# Patient Record
Sex: FEMALE | Race: AMERICAN INDIAN OR ALASKA NATIVE
[De-identification: names, ages, dates, MRNs, and addresses within clinical notes are randomized per-mention and may not be internally consistent; named-entity substitution may affect disease eponyms.]

---
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
## 2022-03-23 ENCOUNTER — HOSPITAL ENCOUNTER (EMERGENCY)
Dept: HOSPITAL 5 - ED | Age: 22
Discharge: HOME | End: 2022-03-23
Payer: MEDICAID

## 2022-03-23 VITALS — SYSTOLIC BLOOD PRESSURE: 118 MMHG | DIASTOLIC BLOOD PRESSURE: 70 MMHG

## 2022-03-23 DIAGNOSIS — N39.0: Primary | ICD-10-CM

## 2022-03-23 LAB
BACTERIA #/AREA URNS HPF: (no result) /HPF
MUCOUS THREADS #/AREA URNS HPF: (no result) /HPF
PH UR STRIP: 6 [PH] (ref 5–7)
PROT UR STRIP-MCNC: (no result) MG/DL
RBC #/AREA URNS HPF: 15 /HPF (ref 0–6)
UROBILINOGEN UR-MCNC: < 2 MG/DL (ref ?–2)
WBC #/AREA URNS HPF: 24 /HPF (ref 0–6)

## 2022-03-23 PROCEDURE — 87086 URINE CULTURE/COLONY COUNT: CPT

## 2022-03-23 PROCEDURE — 87186 SC STD MICRODIL/AGAR DIL: CPT

## 2022-03-23 PROCEDURE — 81025 URINE PREGNANCY TEST: CPT

## 2022-03-23 PROCEDURE — 99283 EMERGENCY DEPT VISIT LOW MDM: CPT

## 2022-03-23 PROCEDURE — 81001 URINALYSIS AUTO W/SCOPE: CPT

## 2022-03-23 PROCEDURE — 87076 CULTURE ANAEROBE IDENT EACH: CPT

## 2022-03-23 NOTE — EMERGENCY DEPARTMENT REPORT
ED Female  HPI





- General


Chief complaint: Urogenital-Female


Stated complaint: PAIN AFTER PEEING


Time Seen by Provider: 03/23/22 16:21


Source: patient


Mode of arrival: Ambulatory


Limitations: No Limitations





- History of Present Illness


Initial comments: 





22-year-old black female with no past medical history presents to the emergency 

department for evaluation of 2-day history of dysuria.  She denies fever, 

abdominal pain, and vaginal discharge.


MD Complaint: dysuria


-: Gradual, days(s)


Location: suprapubic (1)


Severity: mild


Severity scale (0 -10): 2


Quality: aching


Consistency: intermittent


Worsens with: urination


Are you Pregnant Now?: No


Associated Symptoms: dysuria.  denies: vaginal discharge, vaginal bleeding, 

abdominal pain, nausea/vomiting, fever/chills, headaches, loss of appetite, 

hematuria, rash, seizure, shortness of breath, syncope, weakness





- Related Data


                                  Previous Rx's











 Medication  Instructions  Recorded  Last Taken  Type


 


Loperamide HCl [Imodium A-D] 2 mg PO BID #10 capsule 12/01/21 Unknown Rx


 


Promethazine [Phenergan TAB] 25 mg PO Q6HR PRN #20 tab 12/01/21 Unknown Rx


 


Phenazopyridine [Pyridium] 200 mg PO BID #6 tab 03/23/22 Unknown Rx


 


cephALEXin [Keflex] 500 mg PO Q12HR #14 cap 03/23/22 Unknown Rx











                                    Allergies











Allergy/AdvReac Type Severity Reaction Status Date / Time


 


No Known Allergies Allergy   Unverified 12/01/21 18:50














ED Review of Systems


ROS: 


Stated complaint: PAIN AFTER PEEING


Other details as noted in HPI





Comment: All other systems reviewed and negative


Constitutional: denies: chills, fever


Respiratory: denies: shortness of breath


Cardiovascular: denies: chest pain, palpitations


Gastrointestinal: denies: abdominal pain, nausea, vomiting


Musculoskeletal: denies: back pain


Neurological: denies: headache, weakness, numbness, paresthesias





ED Past Medical Hx





- Medications


Home Medications: 


                                Home Medications











 Medication  Instructions  Recorded  Confirmed  Last Taken  Type


 


Loperamide HCl [Imodium A-D] 2 mg PO BID #10 capsule 12/01/21  Unknown Rx


 


Promethazine [Phenergan TAB] 25 mg PO Q6HR PRN #20 tab 12/01/21  Unknown Rx


 


Phenazopyridine [Pyridium] 200 mg PO BID #6 tab 03/23/22  Unknown Rx


 


cephALEXin [Keflex] 500 mg PO Q12HR #14 cap 03/23/22  Unknown Rx














ED Physical Exam





- General


Limitations: No Limitations


General appearance: alert, in no apparent distress





- Head


Head exam: Present: atraumatic, normocephalic





- Eye


Eye exam: Present: normal appearance.  Absent: conjunctival injection





- Neck


Neck exam: Present: normal inspection.  Absent: tenderness





- Respiratory


Respiratory exam: Present: normal lung sounds bilaterally.  Absent: respiratory 

distress





- Cardiovascular


Cardiovascular Exam: Present: regular rate, normal heart sounds





- GI/Abdominal


GI/Abdominal exam: Present: soft, normal bowel sounds.  Absent: distended, 

guarding, rebound, rigid





- Extremities Exam


Extremities exam: Present: normal inspection





- Back Exam


Back exam: Present: normal inspection.  Absent: CVA tenderness (R), CVA 

tenderness (L)





- Neurological Exam


Neurological exam: Present: alert, oriented X3





- Psychiatric


Psychiatric exam: Present: normal affect, normal mood





- Skin


Skin exam: Present: warm, dry, intact, normal color





ED Course


                                   Vital Signs











  03/23/22 03/23/22





  12:33 17:04


 


Temperature 98.5 F 


 


Pulse Rate 67 70


 


Respiratory 16 16





Rate  


 


Blood Pressure 123/53 118/70





[Left]  


 


O2 Sat by Pulse 99 100





Oximetry  














ED Medical Decision Making





- Medical Decision Making


22-year-old black female with no past medical history presents to the emergency 

department for evaluation of 2-day history of dysuria.  She denies fever, 

abdominal pain, and vaginal discharge.





Urine positive for urinary tract infection.  She will be treated with 7-day 

course of 500 mg Keflex twice daily along with Pyridium twice a day for 3 days. 

 She is advised to take medication as prescribed and follow-up with primary care

 provider if no improvement or worsening symptoms.  She verbalized understanding

 of and agreement with plan of care.





Critical care attestation.: 


If time is entered above; I have spent that time in minutes in the direct care 

of this critically ill patient, excluding procedure time.








ED Disposition


Clinical Impression: 


UTI (urinary tract infection)


Qualifiers:


 Urinary tract infection type: acute cystitis Hematuria presence: with hematuria

 Qualified Code(s): N30.01 - Acute cystitis with hematuria





Disposition: 01 HOME / SELF CARE / HOMELESS


Is pt being admited?: No


Does the pt Need Aspirin: No


Condition: Stable


Instructions:  Antibiotic Medicine, Adult, Easy-to-Read, Urinary Tract 

Infection, Adult, Easy-to-Read


Additional Instructions: 


Take medications as prescribed.  Drink plenty of noncaffeinated fluids.  Follow-

up with primary care provider if no improvement or worsening symptoms.


Prescriptions: 


cephALEXin [Keflex] 500 mg PO Q12HR #14 cap


Phenazopyridine [Pyridium] 200 mg PO BID #6 tab


Referrals: 


TIKI GALARZA MD [Referring] - 3-5 Days


Time of Disposition: 16:56